# Patient Record
Sex: FEMALE | Race: WHITE | NOT HISPANIC OR LATINO | Employment: FULL TIME | URBAN - METROPOLITAN AREA
[De-identification: names, ages, dates, MRNs, and addresses within clinical notes are randomized per-mention and may not be internally consistent; named-entity substitution may affect disease eponyms.]

---

## 2017-09-11 ENCOUNTER — HOSPITAL ENCOUNTER (OUTPATIENT)
Dept: FAMILY MEDICINE CLINIC | Facility: CLINIC | Age: 32
Setting detail: SPECIMEN
Discharge: HOME | End: 2017-09-11
Attending: INTERNAL MEDICINE | Admitting: INTERNAL MEDICINE

## 2019-11-15 ENCOUNTER — TELEPHONE (OUTPATIENT)
Dept: FAMILY MEDICINE CLINIC | Facility: CLINIC | Age: 34
End: 2019-11-15

## 2019-11-15 RX ORDER — NITROFURANTOIN 25; 75 MG/1; MG/1
100 CAPSULE ORAL DAILY
Qty: 30 CAPSULE | Refills: 3 | Status: SHIPPED | OUTPATIENT
Start: 2019-11-15 | End: 2021-04-09 | Stop reason: SDUPTHER

## 2019-11-15 NOTE — TELEPHONE ENCOUNTER
Rx sent. Pt is overdue for f/u appt so please see if she'll schedule physical or f/u appt in the next few months. thanks

## 2019-11-15 NOTE — TELEPHONE ENCOUNTER
Patient currently takes Macrobid as needed to prevent uti's.  It is written out once daily, but she basically just takes them before and after intercourse.  Will you call this in for her?

## 2020-01-14 ENCOUNTER — OFFICE VISIT (OUTPATIENT)
Dept: FAMILY MEDICINE CLINIC | Facility: CLINIC | Age: 35
End: 2020-01-14

## 2020-01-14 VITALS
DIASTOLIC BLOOD PRESSURE: 71 MMHG | HEART RATE: 71 BPM | WEIGHT: 124.6 LBS | RESPIRATION RATE: 12 BRPM | TEMPERATURE: 98.4 F | BODY MASS INDEX: 23.53 KG/M2 | SYSTOLIC BLOOD PRESSURE: 110 MMHG | HEIGHT: 61 IN

## 2020-01-14 DIAGNOSIS — F32.9 REACTIVE DEPRESSION: ICD-10-CM

## 2020-01-14 DIAGNOSIS — N39.0 RECURRENT UTI: Primary | ICD-10-CM

## 2020-01-14 PROCEDURE — 99213 OFFICE O/P EST LOW 20 MIN: CPT | Performed by: INTERNAL MEDICINE

## 2020-08-26 ENCOUNTER — TELEPHONE (OUTPATIENT)
Dept: FAMILY MEDICINE CLINIC | Facility: CLINIC | Age: 35
End: 2020-08-26

## 2020-08-26 NOTE — TELEPHONE ENCOUNTER
Patient calling and states she had a rapid Covid-19 test yesterday and it came back negative. She also states one of her daughters just came off quarantine after she tested positive on August 11. Patient started Monday with the following:  - sore throat  - sinus pressure  - fatigue  - fever around 99 to 100 (highest was 101.5)  - congested  - clear mucous  - loss of taste and smell  Would like advice on what to do. States her youngest daughter also stated she felt bad too.     Please call and advise at [phone].

## 2021-04-09 ENCOUNTER — LAB (OUTPATIENT)
Dept: FAMILY MEDICINE CLINIC | Facility: CLINIC | Age: 36
End: 2021-04-09

## 2021-04-09 ENCOUNTER — OFFICE VISIT (OUTPATIENT)
Dept: FAMILY MEDICINE CLINIC | Facility: CLINIC | Age: 36
End: 2021-04-09

## 2021-04-09 VITALS
HEIGHT: 61 IN | DIASTOLIC BLOOD PRESSURE: 70 MMHG | WEIGHT: 125.5 LBS | OXYGEN SATURATION: 99 % | RESPIRATION RATE: 16 BRPM | HEART RATE: 95 BPM | SYSTOLIC BLOOD PRESSURE: 122 MMHG | TEMPERATURE: 97.1 F | BODY MASS INDEX: 23.7 KG/M2

## 2021-04-09 DIAGNOSIS — N39.0 RECURRENT UTI: ICD-10-CM

## 2021-04-09 DIAGNOSIS — Z00.00 ENCOUNTER FOR GENERAL ADULT MEDICAL EXAMINATION WITHOUT ABNORMAL FINDINGS: Primary | ICD-10-CM

## 2021-04-09 DIAGNOSIS — Z00.00 ENCOUNTER FOR GENERAL ADULT MEDICAL EXAMINATION WITHOUT ABNORMAL FINDINGS: ICD-10-CM

## 2021-04-09 PROBLEM — H91.93 HEARING LOSS, BILATERAL: Status: ACTIVE | Noted: 2017-09-11

## 2021-04-09 PROBLEM — F43.21 ADJUSTMENT DISORDER WITH DEPRESSED MOOD: Status: ACTIVE | Noted: 2018-09-28

## 2021-04-09 LAB
BASOPHILS # BLD AUTO: 0.06 10*3/MM3 (ref 0–0.2)
BASOPHILS NFR BLD AUTO: 0.7 % (ref 0–1.5)
DEPRECATED RDW RBC AUTO: 41.8 FL (ref 37–54)
EOSINOPHIL # BLD AUTO: 0.08 10*3/MM3 (ref 0–0.4)
EOSINOPHIL NFR BLD AUTO: 1 % (ref 0.3–6.2)
ERYTHROCYTE [DISTWIDTH] IN BLOOD BY AUTOMATED COUNT: 12.5 % (ref 12.3–15.4)
HCT VFR BLD AUTO: 41.9 % (ref 34–46.6)
HGB BLD-MCNC: 14.2 G/DL (ref 12–15.9)
IMM GRANULOCYTES # BLD AUTO: 0.03 10*3/MM3 (ref 0–0.05)
IMM GRANULOCYTES NFR BLD AUTO: 0.4 % (ref 0–0.5)
LYMPHOCYTES # BLD AUTO: 2.53 10*3/MM3 (ref 0.7–3.1)
LYMPHOCYTES NFR BLD AUTO: 31.1 % (ref 19.6–45.3)
MCH RBC QN AUTO: 31.1 PG (ref 26.6–33)
MCHC RBC AUTO-ENTMCNC: 33.9 G/DL (ref 31.5–35.7)
MCV RBC AUTO: 91.9 FL (ref 79–97)
MONOCYTES # BLD AUTO: 0.58 10*3/MM3 (ref 0.1–0.9)
MONOCYTES NFR BLD AUTO: 7.1 % (ref 5–12)
NEUTROPHILS NFR BLD AUTO: 4.86 10*3/MM3 (ref 1.7–7)
NEUTROPHILS NFR BLD AUTO: 59.7 % (ref 42.7–76)
NRBC BLD AUTO-RTO: 0 /100 WBC (ref 0–0.2)
PLATELET # BLD AUTO: 333 10*3/MM3 (ref 140–450)
PMV BLD AUTO: 10.4 FL (ref 6–12)
RBC # BLD AUTO: 4.56 10*6/MM3 (ref 3.77–5.28)
WBC # BLD AUTO: 8.14 10*3/MM3 (ref 3.4–10.8)

## 2021-04-09 PROCEDURE — 99395 PREV VISIT EST AGE 18-39: CPT | Performed by: PHYSICIAN ASSISTANT

## 2021-04-09 PROCEDURE — 85025 COMPLETE CBC W/AUTO DIFF WBC: CPT | Performed by: PHYSICIAN ASSISTANT

## 2021-04-09 PROCEDURE — 80053 COMPREHEN METABOLIC PANEL: CPT | Performed by: PHYSICIAN ASSISTANT

## 2021-04-09 PROCEDURE — 36415 COLL VENOUS BLD VENIPUNCTURE: CPT

## 2021-04-09 PROCEDURE — 80061 LIPID PANEL: CPT | Performed by: PHYSICIAN ASSISTANT

## 2021-04-09 PROCEDURE — 99213 OFFICE O/P EST LOW 20 MIN: CPT | Performed by: PHYSICIAN ASSISTANT

## 2021-04-09 RX ORDER — NITROFURANTOIN 25; 75 MG/1; MG/1
100 CAPSULE ORAL DAILY
Qty: 30 CAPSULE | Refills: 3 | Status: SHIPPED | OUTPATIENT
Start: 2021-04-09

## 2021-04-09 NOTE — PROGRESS NOTES
"Subjective   Josey De La Cruz is a 35 y.o. female.     Chief Complaint   Patient presents with   • recurring uti       /70 (BP Location: Left arm, Patient Position: Sitting, Cuff Size: Adult)   Pulse 95   Temp 97.1 °F (36.2 °C) (Skin)   Resp 16   Ht 153.7 cm (60.5\")   Wt 56.9 kg (125 lb 8 oz)   SpO2 99%   BMI 24.11 kg/m²     BP Readings from Last 3 Encounters:   04/09/21 122/70   01/14/20 110/71       Wt Readings from Last 3 Encounters:   04/09/21 56.9 kg (125 lb 8 oz)   01/14/20 56.5 kg (124 lb 9.6 oz)       HPI patient presents to the clinic for an annual physical exam.  Patient also has frequent dysuria and polyuria.  She does have reoccurring UTIs.  She denies having any other current symptoms at this time.  No chest pain, shortness of air, abdominal pain, flank pain.  He had urology work-up with urinary tract infections and was put on Macrobid prior to sexual intercourse.  He has had good outcome with this and has had less frequent urinary tract infection due to being on the Macrobid.    The following portions of the patient's history were reviewed and updated as appropriate: allergies, current medications, past family history, past medical history, past social history, past surgical history and problem list.    Review of Systems   Constitutional: Negative for activity change, appetite change and fatigue.   HENT: Negative for congestion, rhinorrhea and sore throat.    Eyes: Negative for blurred vision, pain and visual disturbance.   Respiratory: Negative for cough, chest tightness and shortness of breath.    Cardiovascular: Negative for chest pain and leg swelling.   Gastrointestinal: Negative for abdominal pain, blood in stool and nausea.   Endocrine: Negative for polydipsia and polyuria.   Genitourinary: Negative for dysuria and urgency.   Musculoskeletal: Negative for arthralgias and back pain.   Skin: Negative for rash and bruise.   Allergic/Immunologic: Negative for environmental allergies. "   Neurological: Negative for headache and confusion.   Hematological: Negative for adenopathy. Does not bruise/bleed easily.   Psychiatric/Behavioral: Negative for stress. The patient is not nervous/anxious.        Objective   Physical Exam  Constitutional:       Appearance: She is well-developed.   HENT:      Head: Normocephalic and atraumatic.   Eyes:      Conjunctiva/sclera: Conjunctivae normal.      Pupils: Pupils are equal, round, and reactive to light.   Cardiovascular:      Rate and Rhythm: Normal rate and regular rhythm.      Heart sounds: No murmur heard.     Pulmonary:      Effort: Pulmonary effort is normal.      Breath sounds: Normal breath sounds.   Abdominal:      General: Bowel sounds are normal.      Palpations: Abdomen is soft.      Tenderness: There is no abdominal tenderness.   Musculoskeletal:         General: No deformity. Normal range of motion.      Cervical back: Normal range of motion and neck supple.   Lymphadenopathy:      Cervical: No cervical adenopathy.   Skin:     General: Skin is warm and dry.      Capillary Refill: Capillary refill takes less than 2 seconds.      Findings: No rash.   Neurological:      Mental Status: She is alert and oriented to person, place, and time.      Cranial Nerves: No cranial nerve deficit.   Psychiatric:         Behavior: Behavior normal.         Thought Content: Thought content normal.         Judgment: Judgment normal.           Diagnoses and all orders for this visit:    1. Encounter for general adult medical examination without abnormal findings (Primary)  -     Lipid panel; Future  -     CBC w AUTO Differential; Future  -     Comprehensive metabolic panel; Future    2. Recurrent UTI    Other orders  -     nitrofurantoin, macrocrystal-monohydrate, (Macrobid) 100 MG capsule; Take 1 capsule by mouth Daily.  Dispense: 30 capsule; Refill: 3        Return in about 1 year (around 4/9/2022), or if symptoms worsen or fail to improve.

## 2021-04-10 LAB
ALBUMIN SERPL-MCNC: 4.6 G/DL (ref 3.5–5.2)
ALBUMIN/GLOB SERPL: 2.2 G/DL
ALP SERPL-CCNC: 60 U/L (ref 39–117)
ALT SERPL W P-5'-P-CCNC: 15 U/L (ref 1–33)
ANION GAP SERPL CALCULATED.3IONS-SCNC: 13 MMOL/L (ref 5–15)
AST SERPL-CCNC: 18 U/L (ref 1–32)
BILIRUB SERPL-MCNC: 0.2 MG/DL (ref 0–1.2)
BUN SERPL-MCNC: 20 MG/DL (ref 6–20)
BUN/CREAT SERPL: 18.9 (ref 7–25)
CALCIUM SPEC-SCNC: 9.3 MG/DL (ref 8.6–10.5)
CHLORIDE SERPL-SCNC: 101 MMOL/L (ref 98–107)
CHOLEST SERPL-MCNC: 159 MG/DL (ref 0–200)
CO2 SERPL-SCNC: 26 MMOL/L (ref 22–29)
CREAT SERPL-MCNC: 1.06 MG/DL (ref 0.57–1)
GFR SERPL CREATININE-BSD FRML MDRD: 59 ML/MIN/1.73
GLOBULIN UR ELPH-MCNC: 2.1 GM/DL
GLUCOSE SERPL-MCNC: 86 MG/DL (ref 65–99)
HDLC SERPL-MCNC: 68 MG/DL (ref 40–60)
LDLC SERPL CALC-MCNC: 76 MG/DL (ref 0–100)
LDLC/HDLC SERPL: 1.11 {RATIO}
POTASSIUM SERPL-SCNC: 4.3 MMOL/L (ref 3.5–5.2)
PROT SERPL-MCNC: 6.7 G/DL (ref 6–8.5)
SODIUM SERPL-SCNC: 140 MMOL/L (ref 136–145)
TRIGL SERPL-MCNC: 77 MG/DL (ref 0–150)
VLDLC SERPL-MCNC: 15 MG/DL (ref 5–40)

## 2025-06-03 ENCOUNTER — OFFICE VISIT (OUTPATIENT)
Dept: CARDIOLOGY | Facility: CLINIC | Age: 40
End: 2025-06-03
Payer: COMMERCIAL

## 2025-06-03 VITALS
WEIGHT: 150 LBS | OXYGEN SATURATION: 99 % | HEIGHT: 60 IN | DIASTOLIC BLOOD PRESSURE: 82 MMHG | BODY MASS INDEX: 29.45 KG/M2 | HEART RATE: 75 BPM | SYSTOLIC BLOOD PRESSURE: 123 MMHG

## 2025-06-03 DIAGNOSIS — R00.2 PALPITATIONS: Primary | ICD-10-CM

## 2025-06-03 PROCEDURE — 93000 ELECTROCARDIOGRAM COMPLETE: CPT | Performed by: INTERNAL MEDICINE

## 2025-06-03 PROCEDURE — 99204 OFFICE O/P NEW MOD 45 MIN: CPT | Performed by: INTERNAL MEDICINE

## 2025-06-03 NOTE — PROGRESS NOTES
Encounter Date:2025      Patient ID: Josey De La Cruz is a 39 y.o. female.    Chief Complaint:  Palpitations  Fatigue        History of Present Illness    The patient is a pleasant 39-year-old white female is here for evaluation of palpitations.  The last few weeks patient has been having intermittent palpitations sometimes occasionally associated with shortness of breath.  She is under a lot of stress lately.  2 weeks back her blood pressure was also elevated.  Patient denies having any chest pain heaviness or tightness in the chest.  Patient has chronic UTI.  No other associated aggravating or elevating factors.    The patient has been without any chest discomfort ,shortness of breath,, dizziness or syncope.  Denies having any headache ,abdominal pain ,nausea, vomiting , diarrhea constipation, loss of weight or loss of appetite.  Denies having any excessive bruising ,hematuria or blood in the stool.    Review of all systems negative except as indicated.    Reviewed ROS.  Assessment and Plan     [[[[[[[[[[[[[[[[[[[  Impression  ===========  -Palpitations and fatigue.  Occasional shortness of breath    - Chronic urinary tract infections since childhood.    - Status post  section bladder surgery    - Family history of hypertension    - Non-smoker    - No known allergies  ==========  Plan  ===========  Palpitations probably premature atrial or ventricular contractions.  EKG 6/3/2025-sinus rhythm without ischemic changes.    Holter monitor  Lab work including CMP CBC lipid panel magnesium level and TSH.  Echocardiogram.    Follow-up in the office soon after the testing is completed.    Further plan will depend on patient's progress.    Reviewed and updated 6/3/2025.    ]]]]]]]]]]]]]]]]]]]     Diagnosis Plan   1. Palpitations  Adult Transthoracic Echo Complete W/ Cont if Necessary Per Protocol    Holter Monitor - 72 Hour Up To 15 Days    Comprehensive Metabolic Panel    Magnesium    TSH    Lipid Panel     "CBC & Differential      LAB RESULTS (LAST 7 DAYS)    CBC        BMP        CMP         BNP        TROPONIN        CoAg        Creatinine Clearance  CrCl cannot be calculated (Patient's most recent lab result is older than the maximum 30 days allowed.).    ABG        Radiology  No radiology results for the last day                The following portions of the patient's history were reviewed and updated as appropriate: allergies, current medications, past family history, past medical history, past social history, past surgical history, and problem list.    Review of Systems   Constitutional: Positive for malaise/fatigue.   Cardiovascular:  Positive for leg swelling and palpitations. Negative for chest pain and syncope.   Respiratory:  Negative for shortness of breath.    Skin:  Negative for rash.   Gastrointestinal:  Negative for nausea and vomiting.   Neurological:  Negative for dizziness, light-headedness and numbness.   All other systems reviewed and are negative.        Current Outpatient Medications:     NON FORMULARY, \"UQURA\" bladder flush OTC med, Disp: , Rfl:     No Known Allergies    Family History   Problem Relation Age of Onset    Hypertension Father     Hypertension Maternal Grandmother     Heart attack Paternal Grandfather        Past Surgical History:   Procedure Laterality Date    BLADDER SURGERY      2 YEARS OLD     SECTION      X3     ENDOMETRIAL ABLATION      OTHER SURGICAL HISTORY      STYE REMOVED FROM BOTH EYES       Past Medical History:   Diagnosis Date    Adjustment disorder with depressed mood     Bladder problem     Hearing loss     Recurrent UTI        Family History   Problem Relation Age of Onset    Hypertension Father     Hypertension Maternal Grandmother     Heart attack Paternal Grandfather        Social History     Socioeconomic History    Marital status: Unknown   Tobacco Use    Smoking status: Never     Passive exposure: Never    Smokeless tobacco: Never   Vaping Use    " "Vaping status: Never Used   Substance and Sexual Activity    Alcohol use: Yes     Comment: rarely    Drug use: Never    Sexual activity: Defer           ECG 12 Lead    Date/Time: 6/3/2025 4:17 PM  Performed by: Jim Rodriguez MD    Authorized by: Jim Rodriguez MD  Comparison: compared with previous ECG   Comparison to previous ECG: Normal sinus rhythm nonspecific ST-T wave changes normal axis normal intervals no ectopy no prior tracing for comparison.        Objective:       Physical Exam    /82 (BP Location: Right arm, Patient Position: Sitting, Cuff Size: Adult)   Pulse 75   Ht 152.4 cm (60\")   Wt 68 kg (150 lb)   SpO2 99%   BMI 29.29 kg/m²   The patient is alert, oriented and in no distress.    Vital signs as noted above.    Head and neck revealed no carotid bruits or jugular venous distension.  No thyromegaly or lymphadenopathy is present.    Lungs clear.  No wheezing.  Breath sounds are normal bilaterally.    Heart normal first and second heart sounds.  No murmur..  No pericardial rub is present.  No gallop is present.    Abdomen soft and nontender.  No organomegaly is present.    Extremities revealed good peripheral pulses without any pedal edema.    Skin warm and dry.    Musculoskeletal system is grossly normal.    CNS grossly normal.    Reviewed and updated.        "

## 2025-06-04 ENCOUNTER — PATIENT ROUNDING (BHMG ONLY) (OUTPATIENT)
Dept: CARDIOLOGY | Facility: CLINIC | Age: 40
End: 2025-06-04
Payer: COMMERCIAL

## 2025-06-04 NOTE — PROGRESS NOTES
A My-Chart message has been sent to the patient for PATIENT ROUNDING with [facility]

## 2025-06-05 ENCOUNTER — LAB (OUTPATIENT)
Dept: LAB | Facility: HOSPITAL | Age: 40
End: 2025-06-05
Payer: COMMERCIAL

## 2025-06-05 DIAGNOSIS — R00.2 PALPITATIONS: ICD-10-CM

## 2025-06-05 LAB
ALBUMIN SERPL-MCNC: 4.1 G/DL (ref 3.5–5.2)
ALBUMIN/GLOB SERPL: 1.6 G/DL
ALP SERPL-CCNC: 63 U/L (ref 39–117)
ALT SERPL W P-5'-P-CCNC: 12 U/L (ref 1–33)
ANION GAP SERPL CALCULATED.3IONS-SCNC: 7.4 MMOL/L (ref 5–15)
AST SERPL-CCNC: 16 U/L (ref 1–32)
BASOPHILS # BLD AUTO: 0.03 10*3/MM3 (ref 0–0.2)
BASOPHILS NFR BLD AUTO: 0.5 % (ref 0–1.5)
BILIRUB SERPL-MCNC: 0.4 MG/DL (ref 0–1.2)
BUN SERPL-MCNC: 13 MG/DL (ref 6–20)
BUN/CREAT SERPL: 17.6 (ref 7–25)
CALCIUM SPEC-SCNC: 8.8 MG/DL (ref 8.6–10.5)
CHLORIDE SERPL-SCNC: 105 MMOL/L (ref 98–107)
CHOLEST SERPL-MCNC: 164 MG/DL (ref 0–200)
CO2 SERPL-SCNC: 24.6 MMOL/L (ref 22–29)
CREAT SERPL-MCNC: 0.74 MG/DL (ref 0.57–1)
DEPRECATED RDW RBC AUTO: 43.9 FL (ref 37–54)
EGFRCR SERPLBLD CKD-EPI 2021: 105 ML/MIN/1.73
EOSINOPHIL # BLD AUTO: 0.11 10*3/MM3 (ref 0–0.4)
EOSINOPHIL NFR BLD AUTO: 1.9 % (ref 0.3–6.2)
ERYTHROCYTE [DISTWIDTH] IN BLOOD BY AUTOMATED COUNT: 12.4 % (ref 12.3–15.4)
GLOBULIN UR ELPH-MCNC: 2.6 GM/DL
GLUCOSE SERPL-MCNC: 90 MG/DL (ref 65–99)
HCT VFR BLD AUTO: 41.3 % (ref 34–46.6)
HDLC SERPL-MCNC: 50 MG/DL (ref 40–60)
HGB BLD-MCNC: 13.2 G/DL (ref 12–15.9)
IMM GRANULOCYTES # BLD AUTO: 0.01 10*3/MM3 (ref 0–0.05)
IMM GRANULOCYTES NFR BLD AUTO: 0.2 % (ref 0–0.5)
LDLC SERPL CALC-MCNC: 102 MG/DL (ref 0–100)
LDLC/HDLC SERPL: 2.04 {RATIO}
LYMPHOCYTES # BLD AUTO: 2 10*3/MM3 (ref 0.7–3.1)
LYMPHOCYTES NFR BLD AUTO: 35.1 % (ref 19.6–45.3)
MAGNESIUM SERPL-MCNC: 2.3 MG/DL (ref 1.6–2.6)
MCH RBC QN AUTO: 30.8 PG (ref 26.6–33)
MCHC RBC AUTO-ENTMCNC: 32 G/DL (ref 31.5–35.7)
MCV RBC AUTO: 96.3 FL (ref 79–97)
MONOCYTES # BLD AUTO: 0.48 10*3/MM3 (ref 0.1–0.9)
MONOCYTES NFR BLD AUTO: 8.4 % (ref 5–12)
NEUTROPHILS NFR BLD AUTO: 3.06 10*3/MM3 (ref 1.7–7)
NEUTROPHILS NFR BLD AUTO: 53.9 % (ref 42.7–76)
NRBC BLD AUTO-RTO: 0 /100 WBC (ref 0–0.2)
PLATELET # BLD AUTO: 309 10*3/MM3 (ref 140–450)
PMV BLD AUTO: 10.4 FL (ref 6–12)
POTASSIUM SERPL-SCNC: 3.9 MMOL/L (ref 3.5–5.2)
PROT SERPL-MCNC: 6.7 G/DL (ref 6–8.5)
RBC # BLD AUTO: 4.29 10*6/MM3 (ref 3.77–5.28)
SODIUM SERPL-SCNC: 137 MMOL/L (ref 136–145)
TRIGL SERPL-MCNC: 60 MG/DL (ref 0–150)
TSH SERPL DL<=0.05 MIU/L-ACNC: 2.22 UIU/ML (ref 0.27–4.2)
VLDLC SERPL-MCNC: 12 MG/DL (ref 5–40)
WBC NRBC COR # BLD AUTO: 5.69 10*3/MM3 (ref 3.4–10.8)

## 2025-06-05 PROCEDURE — 36415 COLL VENOUS BLD VENIPUNCTURE: CPT

## 2025-06-05 PROCEDURE — 85025 COMPLETE CBC W/AUTO DIFF WBC: CPT

## 2025-06-05 PROCEDURE — 80053 COMPREHEN METABOLIC PANEL: CPT

## 2025-06-05 PROCEDURE — 80061 LIPID PANEL: CPT

## 2025-06-05 PROCEDURE — 83735 ASSAY OF MAGNESIUM: CPT

## 2025-06-05 PROCEDURE — 84443 ASSAY THYROID STIM HORMONE: CPT

## 2025-06-19 ENCOUNTER — HOSPITAL ENCOUNTER (OUTPATIENT)
Dept: CARDIOLOGY | Facility: HOSPITAL | Age: 40
Discharge: HOME | End: 2025-06-19
Admitting: INTERNAL MEDICINE
Payer: COMMERCIAL

## 2025-06-19 VITALS
BODY MASS INDEX: 29.45 KG/M2 | HEIGHT: 60 IN | DIASTOLIC BLOOD PRESSURE: 77 MMHG | SYSTOLIC BLOOD PRESSURE: 118 MMHG | WEIGHT: 150 LBS

## 2025-06-19 DIAGNOSIS — R00.2 PALPITATIONS: ICD-10-CM

## 2025-06-19 LAB
AORTIC DIMENSIONLESS INDEX: 0.71 (DI)
AV MEAN PRESS GRAD SYS DOP V1V2: 4.3 MMHG
AV VMAX SYS DOP: 134.1 CM/SEC
BH CV ECHO LEFT VENTRICLE GLOBAL LONGITUDINAL STRAIN: -19.6 %
BH CV ECHO MEAS - AO MAX PG: 7.2 MMHG
BH CV ECHO MEAS - AO ROOT DIAM: 2.6 CM
BH CV ECHO MEAS - AO V2 VTI: 29 CM
BH CV ECHO MEAS - AVA(I,D): 2.01 CM2
BH CV ECHO MEAS - EDV(CUBED): 80.8 ML
BH CV ECHO MEAS - EDV(MOD-SP2): 68.4 ML
BH CV ECHO MEAS - EDV(MOD-SP4): 69.3 ML
BH CV ECHO MEAS - EF(MOD-SP2): 65.2 %
BH CV ECHO MEAS - EF(MOD-SP4): 66.4 %
BH CV ECHO MEAS - ESV(CUBED): 25.4 ML
BH CV ECHO MEAS - ESV(MOD-SP2): 23.8 ML
BH CV ECHO MEAS - ESV(MOD-SP4): 23.3 ML
BH CV ECHO MEAS - FS: 32 %
BH CV ECHO MEAS - IVS/LVPW: 0.88 CM
BH CV ECHO MEAS - IVSD: 0.81 CM
BH CV ECHO MEAS - LA DIMENSION: 3.2 CM
BH CV ECHO MEAS - LAT PEAK E' VEL: 11.8 CM/SEC
BH CV ECHO MEAS - LV DIASTOLIC VOL/BSA (35-75): 41.9 CM2
BH CV ECHO MEAS - LV MASS(C)D: 117.7 GRAMS
BH CV ECHO MEAS - LV MAX PG: 4.7 MMHG
BH CV ECHO MEAS - LV MEAN PG: 2.6 MMHG
BH CV ECHO MEAS - LV SYSTOLIC VOL/BSA (12-30): 14.1 CM2
BH CV ECHO MEAS - LV V1 MAX: 107.9 CM/SEC
BH CV ECHO MEAS - LV V1 VTI: 20.5 CM
BH CV ECHO MEAS - LVIDD: 4.3 CM
BH CV ECHO MEAS - LVIDS: 2.9 CM
BH CV ECHO MEAS - LVOT AREA: 2.8 CM2
BH CV ECHO MEAS - LVOT DIAM: 1.9 CM
BH CV ECHO MEAS - LVPWD: 0.92 CM
BH CV ECHO MEAS - MED PEAK E' VEL: 10.2 CM/SEC
BH CV ECHO MEAS - MV A DUR: 0.16 SEC
BH CV ECHO MEAS - MV A MAX VEL: 75.5 CM/SEC
BH CV ECHO MEAS - MV DEC SLOPE: 547.8 CM/SEC2
BH CV ECHO MEAS - MV DEC TIME: 0.15 SEC
BH CV ECHO MEAS - MV E MAX VEL: 82 CM/SEC
BH CV ECHO MEAS - MV E/A: 1.08
BH CV ECHO MEAS - MV MAX PG: 5.9 MMHG
BH CV ECHO MEAS - MV MEAN PG: 2.7 MMHG
BH CV ECHO MEAS - MV V2 VTI: 31.6 CM
BH CV ECHO MEAS - MVA(VTI): 1.84 CM2
BH CV ECHO MEAS - PA ACC TIME: 0.19 SEC
BH CV ECHO MEAS - PA V2 MAX: 81.8 CM/SEC
BH CV ECHO MEAS - PULM A REVS DUR: 0.1 SEC
BH CV ECHO MEAS - PULM A REVS VEL: 31.6 CM/SEC
BH CV ECHO MEAS - PULM DIAS VEL: 41.2 CM/SEC
BH CV ECHO MEAS - PULM S/D: 0.97
BH CV ECHO MEAS - PULM SYS VEL: 40.1 CM/SEC
BH CV ECHO MEAS - RAP SYSTOLE: 3 MMHG
BH CV ECHO MEAS - RV MAX PG: 2.5 MMHG
BH CV ECHO MEAS - RV V1 MAX: 79.1 CM/SEC
BH CV ECHO MEAS - RV V1 VTI: 16.8 CM
BH CV ECHO MEAS - RVDD: 3.1 CM
BH CV ECHO MEAS - RVSP: 23 MMHG
BH CV ECHO MEAS - SV(LVOT): 58.3 ML
BH CV ECHO MEAS - SV(MOD-SP2): 44.6 ML
BH CV ECHO MEAS - SV(MOD-SP4): 46 ML
BH CV ECHO MEAS - SVI(LVOT): 35.3 ML/M2
BH CV ECHO MEAS - SVI(MOD-SP2): 27 ML/M2
BH CV ECHO MEAS - SVI(MOD-SP4): 27.9 ML/M2
BH CV ECHO MEAS - TAPSE (>1.6): 2.6 CM
BH CV ECHO MEAS - TR MAX PG: 20 MMHG
BH CV ECHO MEAS - TR MAX VEL: 222.7 CM/SEC
BH CV ECHO MEASUREMENTS AVERAGE E/E' RATIO: 7.45
LV EF BIPLANE MOD: 65 %

## 2025-06-19 PROCEDURE — 93356 MYOCRD STRAIN IMG SPCKL TRCK: CPT | Performed by: INTERNAL MEDICINE

## 2025-06-19 PROCEDURE — 93306 TTE W/DOPPLER COMPLETE: CPT | Performed by: INTERNAL MEDICINE

## 2025-06-19 PROCEDURE — 93356 MYOCRD STRAIN IMG SPCKL TRCK: CPT

## 2025-06-19 PROCEDURE — 93306 TTE W/DOPPLER COMPLETE: CPT

## 2025-06-24 ENCOUNTER — RESULTS FOLLOW-UP (OUTPATIENT)
Dept: CARDIOLOGY | Facility: CLINIC | Age: 40
End: 2025-06-24
Payer: COMMERCIAL